# Patient Record
Sex: MALE | Race: WHITE
[De-identification: names, ages, dates, MRNs, and addresses within clinical notes are randomized per-mention and may not be internally consistent; named-entity substitution may affect disease eponyms.]

---

## 2019-08-04 ENCOUNTER — HOSPITAL ENCOUNTER (EMERGENCY)
Dept: HOSPITAL 56 - MW.ED | Age: 24
Discharge: HOME | End: 2019-08-04
Payer: OTHER GOVERNMENT

## 2019-08-04 DIAGNOSIS — L01.00: Primary | ICD-10-CM

## 2019-08-04 DIAGNOSIS — Z77.098: ICD-10-CM

## 2019-08-04 DIAGNOSIS — F17.210: ICD-10-CM

## 2019-08-04 NOTE — EDM.PDOC
ED HPI GENERAL MEDICAL PROBLEM





- General


Chief Complaint: Skin Complaint


Stated Complaint: CHEM BURNS ON SIDE OF HEAD


Time Seen by Provider: 08/04/19 16:13





- History of Present Illness


INITIAL COMMENTS - FREE TEXT/NARRATIVE: 





HISTORY AND PHYSICAL:





History of present illness:


The patient is a healthy 24-year-old male who works around numerous powdered 

chemicals and wears a full suit and headgear as well as a headset on his ears 

and presents with complaints of burning-like sensation reddened skin and some 

weeping and crusting of the skin around his ears bilaterally that started 

yesterday. He says he noticed a burning sensation where his headset sits on his 

ears yesterday after work and he rinsed the area thoroughly. Since that time 

the burning has increased and now he is noticing redness some weeping from the 

area and some crusting with this drainage. His ears internally are without pain 

and he has no other areas of his face or head of concern or with similar 

symptoms. He has no systemic complaints and no eye burning or irritation. There 

is no facial swelling. He has not noticed any drainage or involvement of the 

auricle or the outer ear.





Review of systems: 


As per history of present illness and below otherwise all systems reviewed and 

negative.





Past medical history: 


As per history of present illness and as reviewed below otherwise 

noncontributory.





Surgical history: 


As per history of present illness and as reviewed below otherwise 

noncontributory.





Social history: 


No reported history of drug or alcohol abuse.





Family history: 


As per history of present illness and as reviewed below otherwise 

noncontributory.





Physical exam:


General: Well-developed well-nourished man who is nontoxic and vital signs are 

noted by me


HEENT: Atraumatic, normocephalic, pupils reactive, negative for conjunctival 

pallor or scleral icterus, mucous membranes moist, throat clear, neck supple, 

nontender, trachea midline. There is no cervical adenopathy or nuchal rigidity 

and the TMs are normal bilaterally. Please see below skin exam for scalp exam.


Lungs: Clear to auscultation, breath sounds equal bilaterally, chest nontender.


Heart: S1S2, regular rate and rhythm no overt murmurs


Abdomen: Soft, nondistended, nontender. NABS


Pelvis: Deferred


Genitourinary: Deferred.


Rectal: Deferred.


Extremities: Atraumatic, full range of motion that defects or deficits 

Neurovascular unremarkable.


Neuro: Awake, alert, oriented. Cranial nerves II through XII unremarkable. 

Cerebellum unremarkable. Motor and sensory unremarkable throughout. Exam 

nonfocal.


Skin: At the supra auricular areas of the scalp there is reddened skin which is 

ill-defined and is greater on the left side than on the right and there is some 

honey crusting seen on the left side and some weepage but no discrete open 

areas. Both sides have excoriated skin but again no lacerations vesicles or 

open wounds. There is no soft tissue swelling here and there is some mild 

tenderness. There is no hair he auricular adenopathy


Diagnostics:


[]





Therapeutics:


Bactroban





Impression: 


Chemical exposure of scalp, secondary impetigo





Definitive disposition and diagnosis as appropriate pending reevaluation and 

review of above.


  ** face


Pain Score (Numeric/FACES): 4





- Related Data


 Allergies











Allergy/AdvReac Type Severity Reaction Status Date / Time


 


No Known Allergies Allergy   Verified 08/04/19 16:16











Home Meds: 


 Home Meds





. [No Known Home Meds]  08/04/19 [History]











Past Medical History





- Past Surgical History


HEENT Surgical History: Reports: Adenoidectomy, Tonsillectomy


Other HEENT Surgeries/Procedures: wisdom teeth





Social & Family History





- Family History


Family Medical History: Noncontributory





- Tobacco Use


Smoking Status *Q: Light Tobacco Smoker


Years of Tobacco use: 1


Packs/Tins Daily: 0.1





- Recreational Drug Use


Recreational Drug Use: No





ED ROS GENERAL





- Review of Systems


Review Of Systems: ROS reveals no pertinent complaints other than HPI.





ED EXAM, SKIN/RASH


Exam: See Below (See dictation)





Course





- Vital Signs


Last Recorded V/S: 





 Last Vital Signs











Temp  36.6 C   08/04/19 16:17


 


Pulse  72   08/04/19 16:17


 


Resp  18   08/04/19 16:17


 


BP  147/86 H  08/04/19 16:17


 


Pulse Ox  97   08/04/19 16:17














- Orders/Labs/Meds


Orders: 





 Active Orders 24 hr











 Category Date Time Status


 


 Mupirocin Oint [Bactroban Oint] Med  08/04/19 22:00 Ordered





 0.5 gm TOP TID   














Departure





- Departure


Time of Disposition: 16:36


Disposition: Home, Self-Care 01


Condition: Good


Clinical Impression: 


 Chemical exposure, Impetigo








- Discharge Information


Referrals: 


PCP,Unknown [Primary Care Provider] - 


Additional Instructions: 


The following information is given to patients seen in the emergency department 

who are being discharged to home. This information is to outline your options 

for follow-up care. We provide all patients seen in our emergency department 

with a follow-up referral.





The need for follow-up, as well as the timing and circumstances, are variable 

depending upon the specifics of your emergency department visit.





If you don't have a primary care physician on staff, we will provide you with a 

referral. We always advise you to contact your personal physician following an 

emergency department visit to inform them of the circumstance of the visit and 

for follow-up with them and/or the need for any referrals to a consulting 

specialist.





The emergency department will also refer you to a specialist when appropriate. 

This referral assures that you have the opportunity for followup care with a 

specialist. All of these measure are taken in an effort to provide you with 

optimal care, which includes your followup.





Under all circumstances we always encourage you to contact your private 

physician who remains a resource for coordinating  your care. When calling for 

followup care, please make the office aware that this follow-up is from your 

recent emergency room visit. If for any reason you are refused follow-up, 

please contact the [facility] emergency 

department at [phone] and ask to speak to the emergency department 

charge nurse.





[facility] 


Primary care- Internal Medicine and Family [address] [address]


[phone]








Keep areas clean and dry with mild soap and water pat dry and apply the ointment

, Bactroban, that you have been given here from the ED. Please do this 2-3 

times a day for the next 7 days. Please schedule a follow-up appointment with 

your provider or one of hours for reevaluation further care and return to ER as 

needed and as discussed





- My Orders


Last 24 Hours: 





My Active Orders





08/04/19 22:00


Mupirocin Oint [Bactroban Oint]   0.5 gm TOP TID 














- Assessment/Plan


Last 24 Hours: 





My Active Orders





08/04/19 22:00


Mupirocin Oint [Bactroban Oint]   0.5 gm TOP TID